# Patient Record
(demographics unavailable — no encounter records)

---

## 2024-11-19 NOTE — PHYSICAL EXAM
[de-identified] : no palpable thyroid nodules [Laryngoscopy Performed] : laryngoscopy was performed, see procedure section for findings [Midline] : located in midline position [Normal] : orientation to person, place, and time: normal

## 2024-11-19 NOTE — HISTORY OF PRESENT ILLNESS
[de-identified] : prior evaluation of cervical adenopathy. cytologically benign. denies any symptoms related. no changes medically since last visit. recent sonogram shows dominant node to be benign appearing and stable. I have reviewed all old and new data and available images.

## 2024-11-19 NOTE — ASSESSMENT
[FreeTextEntry1] : continued f/u with PCP.  no indication for any biopsies, radiographs or antibiotics. patient has been given the opportunity to ask questions, and all of the patient's questions have been answered to their satisfaction